# Patient Record
Sex: MALE | Race: WHITE
[De-identification: names, ages, dates, MRNs, and addresses within clinical notes are randomized per-mention and may not be internally consistent; named-entity substitution may affect disease eponyms.]

---

## 2018-03-23 ENCOUNTER — HOSPITAL ENCOUNTER (EMERGENCY)
Dept: HOSPITAL 17 - PHED | Age: 43
Discharge: HOME | End: 2018-03-23
Payer: OTHER GOVERNMENT

## 2018-03-23 VITALS
DIASTOLIC BLOOD PRESSURE: 66 MMHG | OXYGEN SATURATION: 99 % | RESPIRATION RATE: 16 BRPM | SYSTOLIC BLOOD PRESSURE: 136 MMHG | HEART RATE: 76 BPM

## 2018-03-23 VITALS
SYSTOLIC BLOOD PRESSURE: 129 MMHG | DIASTOLIC BLOOD PRESSURE: 65 MMHG | HEART RATE: 89 BPM | OXYGEN SATURATION: 99 % | RESPIRATION RATE: 16 BRPM

## 2018-03-23 VITALS
HEART RATE: 83 BPM | SYSTOLIC BLOOD PRESSURE: 131 MMHG | DIASTOLIC BLOOD PRESSURE: 66 MMHG | OXYGEN SATURATION: 97 % | RESPIRATION RATE: 16 BRPM

## 2018-03-23 VITALS — SYSTOLIC BLOOD PRESSURE: 141 MMHG | DIASTOLIC BLOOD PRESSURE: 80 MMHG

## 2018-03-23 VITALS — WEIGHT: 210.54 LBS | BODY MASS INDEX: 30.14 KG/M2 | HEIGHT: 70 IN

## 2018-03-23 VITALS
RESPIRATION RATE: 16 BRPM | HEART RATE: 90 BPM | SYSTOLIC BLOOD PRESSURE: 87 MMHG | OXYGEN SATURATION: 98 % | DIASTOLIC BLOOD PRESSURE: 58 MMHG

## 2018-03-23 VITALS
HEART RATE: 94 BPM | OXYGEN SATURATION: 97 % | DIASTOLIC BLOOD PRESSURE: 59 MMHG | RESPIRATION RATE: 16 BRPM | SYSTOLIC BLOOD PRESSURE: 98 MMHG

## 2018-03-23 VITALS
SYSTOLIC BLOOD PRESSURE: 122 MMHG | DIASTOLIC BLOOD PRESSURE: 69 MMHG | RESPIRATION RATE: 16 BRPM | OXYGEN SATURATION: 97 % | HEART RATE: 94 BPM

## 2018-03-23 VITALS
OXYGEN SATURATION: 100 % | TEMPERATURE: 98.1 F | HEART RATE: 87 BPM | DIASTOLIC BLOOD PRESSURE: 77 MMHG | SYSTOLIC BLOOD PRESSURE: 133 MMHG | RESPIRATION RATE: 16 BRPM

## 2018-03-23 DIAGNOSIS — T78.05XA: Primary | ICD-10-CM

## 2018-03-23 DIAGNOSIS — E03.9: ICD-10-CM

## 2018-03-23 DIAGNOSIS — Z91.018: ICD-10-CM

## 2018-03-23 DIAGNOSIS — Z79.899: ICD-10-CM

## 2018-03-23 PROCEDURE — 96361 HYDRATE IV INFUSION ADD-ON: CPT

## 2018-03-23 PROCEDURE — 99284 EMERGENCY DEPT VISIT MOD MDM: CPT

## 2018-03-23 PROCEDURE — 96375 TX/PRO/DX INJ NEW DRUG ADDON: CPT

## 2018-03-23 PROCEDURE — 96374 THER/PROPH/DIAG INJ IV PUSH: CPT

## 2018-03-23 PROCEDURE — 96372 THER/PROPH/DIAG INJ SC/IM: CPT

## 2018-03-23 RX ADMIN — Medication PRN ML: at 14:12

## 2018-03-23 RX ADMIN — Medication PRN ML: at 13:24

## 2018-03-23 NOTE — PD
HPI


Chief Complaint:  Allergic/Adverse Reaction


Time Seen by Provider:  13:05


Travel History


International Travel<30 days:  No


Contact w/Intl Traveler<30days:  No


Traveled to known affect area:  No





History of Present Illness


HPI


The patient is a 43-year-old male who presents to the emergency department for 

allergic reaction after ingesting pine nuts.  The patient has a history of 

allergies to pine nuts, has required epinephrine intramuscularly in the past.  

The patient accidentally ate some of the knots that were on a salad earlier 

today.  He presents with difficulty swallowing, swelling of the throat, 

tingling all over, and abdominal discomfort.  He denies any shortness of breath

, chest tightness, or wheezing.  Symptoms are moderate.  Exacerbated after 

accidentally ingesting pine nuts, and there are no current alleviating factors.





PFSH


Past Medical History


Cancer:  No


Cardiovascular Problems:  No


Diabetes:  No


Diminished Hearing:  No


Genitourinary:  No


Hepatitis:  No


Hiatal Hernia:  No


Immune Disorder:  No


Musculoskeletal:  No


Neurologic:  No


Psychiatric:  No


Respiratory:  No


Thyroid Disease:  Yes (HYPOTHYROID)





Past Surgical History


Body Medical Devices:  NONE





Social History


Alcohol Use:  Yes (SOCIALLY)


Tobacco Use:  No


Substance Use:  No





Allergies-Medications


(Allergen,Severity, Reaction):  


Coded Allergies:  


     pine nut (Unverified  Allergy, Severe, rash, 3/23/18)


     shrimp (Unverified  Allergy, Severe, THROAT SWELLING, 3/23/18)


Reported Meds & Prescriptions





Reported Meds & Active Scripts


Active


Reported


Ventolin Hfa 18 GM Inh (Albuterol Sulfate) 90 Mcg/Act Aer 2 Puff INH Q4-6H PRN


Viagra (Sildenafil Citrate) 50 Mg Tab 50 Mg PO DAILY PRN


Synthroid (Levothyroxine Sodium) 112 Mcg Tab 112 Mcg PO DAILY








Review of Systems


Except as stated in HPI:  all other systems reviewed are Neg


HENT:  Positive: Sore Throat, Other (As noted in the history of present illness)

, No: Lightheadedness


Cardiovascular:  No: Chest Pain or Discomfort


Respiratory:  No: Shortness of Breath


Gastrointestinal:  Positive: Abdominal Pain


Musculoskeletal:  No: Weakness


Skin:  No Rash


Neurologic:  Positive: Sensory Disturbance (Tingling all over the body)





Physical Exam


Narrative


GENERAL: Awake, alert, pleasant 43-year-old male who appears his stated age and 

is in no acute respiratory distress.


SKIN: Focused skin assessment warm/dry.  No visible urticaria.


HEAD: Atraumatic. Normocephalic. 


EYES: Pupils equal and round. No scleral icterus. No injection or drainage. 


ENT: No nasal bleeding or discharge.  Erythema posterior oropharynx.


NECK: Trachea midline. No JVD. 


CARDIOVASCULAR: Regular rate and rhythm.  No murmur appreciated.  Heart rate in 

the 80s.


RESPIRATORY: No accessory muscle use.  No audible wheezing.


GASTROINTESTINAL: Abdomen soft, non-tender, nondistended.  No rebound 

tenderness.


MUSCULOSKELETAL: No obvious deformities. No clubbing.  No cyanosis.  No edema. 


NEUROLOGICAL: Awake and alert. No obvious cranial nerve deficits.  Motor 

grossly within normal limits. Normal speech.


PSYCHIATRIC: Appropriate mood and affect; insight and judgment normal.





Data


Data


Last Documented VS





Vital Signs








  Date Time  Temp Pulse Resp B/P (MAP) Pulse Ox O2 Delivery O2 Flow Rate FiO2


 


3/23/18 17:09  94 16 122/69 (86) 97 Room Air  


 


3/23/18 13:00 98.1       








Orders





 Orders


Ecg Monitoring (3/23/18 13:08)


Iv Access Insert/Monitor (3/23/18 13:08)


Oximetry (3/23/18 13:08)


Diphenhydramine Inj (Benadryl Inj) (3/23/18 13:15)


Methylprednisolone So Succ Inj (Solumedr (3/23/18 13:15)


Famotidine Inj (Pepcid Inj) (3/23/18 13:15)


Sodium Chlor 0.9% 1000 Ml Inj (Ns 1000 M (3/23/18 13:08)


Sodium Chloride 0.9% Flush (Ns Flush) (3/23/18 13:15)


Epinephrine (1:1000) Inj (Adrenalin (1:1 (3/23/18 13:15)


Morphine Inj (Morphine Inj) (3/23/18 14:00)


Ondansetron Inj (Zofran Inj) (3/23/18 14:00)


Hydroxyzine Pamoate (Vistaril) (3/23/18 15:00)


Sodium Chlor 0.9% 1000 Ml Inj (Ns 1000 M (3/23/18 15:30)








MDM


Medical Decision Making


Medical Screen Exam Complete:  Yes


Emergency Medical Condition:  Yes


Medical Record Reviewed:  Yes


Differential Diagnosis


Differential diagnosis includes anaphylaxis, urticaria, allergic reaction, 

stridor.


Narrative Course


IV was established, labs are and the patient was placed on cardiac telemetry 

monitoring and continuous pulse oximetry monitoring.  The patient was 

administered epinephrine 0.3 mg intramuscularly.  The patient then received Solu

-Medrol 125 mg intravenously, Benadryl 50 mg intravenously, Pepcid 20 mg 

intravenously with 1 L of IV fluids.  The patient was then monitored in the 

emergency department.  The patient was reevaluated at 5 PM, his symptoms have 

resolved.  The patient already has an EpiPen at home, [facility] 

[facility].  The patient will be sent home on prednisone, Zantac, and Benadryl.

  He is advised to return if symptoms worsen or progress.





Diagnosis





 Primary Impression:  


 Anaphylaxis


 Qualified Codes:  T78.2XXA - Anaphylactic shock, unspecified, initial encounter


Patient Instructions:  General Instructions





***Additional Instructions:  


Medications as directed.  [facility].  Return if symptoms 

worsen or progress.  Carry the EpiPen you already own with you at all times.


***Med/Other Pt SpecificInfo:  Prescription(s) given


Scripts


Diphenhydramine (Diphenhydramine) 25 Mg Cap


25 MG PO Q6H Y for ALLERGIES, #20 CAP 0 Refills


   Prov: Leonel Perez MD         3/23/18 


Ranitidine (Zantac) 150 Mg Tab


150 MG PO BID for Reduce Stomach Acid for 5 Days, #10 TAB 0 Refills


   Prov: Leonel Perez MD         3/23/18 


Prednisone (Prednisone) 20 Mg Tab


40 MG PO DAILY, #10 TAB 0 Refills


   Take 40 mg (2 tablets) daily for 5 days


   Prov: Leonel Perez MD         3/23/18


Disposition:  01 DISCHARGE HOME


Condition:  Stable











Leonel Perez MD Mar 23, 2018 13:36